# Patient Record
Sex: FEMALE | Race: WHITE | NOT HISPANIC OR LATINO | Employment: FULL TIME | URBAN - METROPOLITAN AREA
[De-identification: names, ages, dates, MRNs, and addresses within clinical notes are randomized per-mention and may not be internally consistent; named-entity substitution may affect disease eponyms.]

---

## 2017-05-01 ENCOUNTER — APPOINTMENT (OUTPATIENT)
Dept: MAMMOGRAPHY | Facility: HOSPITAL | Age: 42
End: 2017-05-01
Attending: OBSTETRICS & GYNECOLOGY

## 2017-05-05 ENCOUNTER — HOSPITAL ENCOUNTER (OUTPATIENT)
Dept: MAMMOGRAPHY | Facility: HOSPITAL | Age: 42
Discharge: HOME | End: 2017-05-05
Attending: OBSTETRICS & GYNECOLOGY | Admitting: OBSTETRICS & GYNECOLOGY

## 2017-05-05 ENCOUNTER — TRANSCRIBE ORDERS (OUTPATIENT)
Dept: MAMMOGRAPHY | Facility: HOSPITAL | Age: 42
End: 2017-05-05

## 2017-05-05 DIAGNOSIS — R92.8 ABNORMAL MAMMOGRAM: Primary | ICD-10-CM

## 2017-05-05 DIAGNOSIS — R92.8 ABNORMAL MAMMOGRAM: ICD-10-CM

## 2017-05-05 PROCEDURE — G0204 DX MAMMO INCL CAD BI: HCPCS

## 2017-05-05 PROCEDURE — 77066 DX MAMMO INCL CAD BI: CPT | Performed by: RADIOLOGY

## 2017-05-05 PROCEDURE — 77062 BREAST TOMOSYNTHESIS BI: CPT | Performed by: RADIOLOGY

## 2017-05-05 PROCEDURE — G0279 TOMOSYNTHESIS, MAMMO: HCPCS

## 2018-02-06 ENCOUNTER — TRANSCRIBE ORDERS (OUTPATIENT)
Dept: ADMINISTRATIVE | Facility: HOSPITAL | Age: 43
End: 2018-02-06

## 2018-02-06 DIAGNOSIS — R07.9 CHEST PAIN, UNSPECIFIED TYPE: Primary | ICD-10-CM

## 2018-02-15 ENCOUNTER — HOSPITAL ENCOUNTER (OUTPATIENT)
Dept: CARDIOLOGY | Facility: HOSPITAL | Age: 43
Discharge: HOME | End: 2018-02-15
Admitting: NURSE PRACTITIONER

## 2018-02-15 VITALS
SYSTOLIC BLOOD PRESSURE: 120 MMHG | BODY MASS INDEX: 23.05 KG/M2 | WEIGHT: 135 LBS | HEIGHT: 64 IN | DIASTOLIC BLOOD PRESSURE: 80 MMHG | HEART RATE: 71 BPM

## 2018-02-15 DIAGNOSIS — R07.9 CHEST PAIN, UNSPECIFIED TYPE: ICD-10-CM

## 2018-02-15 LAB
BH CV STRESS BP STAGE 1: NORMAL
BH CV STRESS BP STAGE 2: NORMAL
BH CV STRESS BP STAGE 3: NORMAL
BH CV STRESS DURATION MIN STAGE 1: 3
BH CV STRESS DURATION MIN STAGE 2: 3
BH CV STRESS DURATION MIN STAGE 3: 3
BH CV STRESS DURATION MIN STAGE 4: 1
BH CV STRESS DURATION SEC STAGE 1: 0
BH CV STRESS DURATION SEC STAGE 2: 0
BH CV STRESS DURATION SEC STAGE 3: 0
BH CV STRESS DURATION SEC STAGE 4: 30
BH CV STRESS GRADE STAGE 1: 10
BH CV STRESS GRADE STAGE 2: 12
BH CV STRESS GRADE STAGE 3: 14
BH CV STRESS GRADE STAGE 4: 16
BH CV STRESS HR STAGE 1: 93
BH CV STRESS HR STAGE 2: 116
BH CV STRESS HR STAGE 3: 146
BH CV STRESS HR STAGE 4: 166
BH CV STRESS METS STAGE 1: 5
BH CV STRESS METS STAGE 2: 7.5
BH CV STRESS METS STAGE 3: 10
BH CV STRESS METS STAGE 4: 13.5
BH CV STRESS PROTOCOL 1: NORMAL
BH CV STRESS RECOVERY BP: NORMAL MMHG
BH CV STRESS RECOVERY HR: 88 BPM
BH CV STRESS SPEED STAGE 1: 1.7
BH CV STRESS SPEED STAGE 2: 2.5
BH CV STRESS SPEED STAGE 3: 3.4
BH CV STRESS SPEED STAGE 4: 4.2
BH CV STRESS STAGE 1: 1
BH CV STRESS STAGE 2: 2
BH CV STRESS STAGE 3: 3
BH CV STRESS STAGE 4: 4
MAXIMAL PREDICTED HEART RATE: 178 BPM
PERCENT MAX PREDICTED HR: 94.94 %
STRESS BASELINE BP: NORMAL MMHG
STRESS BASELINE HR: 85 BPM
STRESS PERCENT HR: 112 %
STRESS POST ESTIMATED WORKLOAD: 12.5 METS
STRESS POST EXERCISE DUR MIN: 10 MIN
STRESS POST EXERCISE DUR SEC: 30 SEC
STRESS POST PEAK BP: NORMAL MMHG
STRESS POST PEAK HR: 169 BPM
STRESS TARGET HR: 151 BPM

## 2018-02-15 PROCEDURE — 93017 CV STRESS TEST TRACING ONLY: CPT

## 2018-02-15 PROCEDURE — 93018 CV STRESS TEST I&R ONLY: CPT | Performed by: INTERNAL MEDICINE

## 2018-02-19 ENCOUNTER — CONSULT (OUTPATIENT)
Dept: CARDIOLOGY | Facility: CLINIC | Age: 43
End: 2018-02-19

## 2018-02-19 VITALS
HEIGHT: 64 IN | DIASTOLIC BLOOD PRESSURE: 68 MMHG | WEIGHT: 135 LBS | HEART RATE: 65 BPM | SYSTOLIC BLOOD PRESSURE: 110 MMHG | BODY MASS INDEX: 23.05 KG/M2

## 2018-02-19 DIAGNOSIS — K21.9 GASTROESOPHAGEAL REFLUX DISEASE, ESOPHAGITIS PRESENCE NOT SPECIFIED: ICD-10-CM

## 2018-02-19 DIAGNOSIS — R07.2 PRECORDIAL PAIN: Primary | ICD-10-CM

## 2018-02-19 PROCEDURE — 93000 ELECTROCARDIOGRAM COMPLETE: CPT | Performed by: INTERNAL MEDICINE

## 2018-02-19 PROCEDURE — 99203 OFFICE O/P NEW LOW 30 MIN: CPT | Performed by: INTERNAL MEDICINE

## 2018-02-19 RX ORDER — OMEPRAZOLE 40 MG/1
40 CAPSULE, DELAYED RELEASE ORAL DAILY
Qty: 30 CAPSULE | Refills: 1 | Status: SHIPPED | OUTPATIENT
Start: 2018-02-19 | End: 2021-12-01

## 2018-02-19 RX ORDER — MEDROXYPROGESTERONE ACETATE 10 MG/1
1 TABLET ORAL
COMMUNITY
Start: 2018-01-18 | End: 2021-12-01

## 2018-02-19 NOTE — PROGRESS NOTES
Subjective:     Encounter Date:02/19/2018    Primary Care Physician: No Known Provider      Patient ID: Nathalia Bradshaw is a 43 y.o. female.    Chief Complaint:Chest Pain; Shortness of Breath; and Dizziness    PROBLEM LIST:  1. Chest pain  a. 2/2018 normal routine GXT  2. Surgeries:  a. Bilateral breast augmentation  b. Tonsillectomy and adenoidectomy      No Known Allergies      Current Outpatient Prescriptions:   •  medroxyPROGESTERone (PROVERA) 10 MG tablet, 1 tablet. 21 days out of the month, Disp: , Rfl:         History of Present Illness    Patient is a 43-year-old  female who we are seeing today for further evaluation of chest pain.  She has no previous history of coronary disease.  She has had chest pain off and on over the years.  Here within the last few weeks she had a severe.  Patient notes that this was a severe squeezing sensation which began after she laid down.  She got up and was noting to have some associated shortness of breath and dizziness.  It was also being made worse with deep breathing.  Since that time she has had some recurrent discomfort.  No specific triggering factors are noted.  In between these episodes she denies any cardiovascular limiting symptoms.  Does complain of some mild fatigue.  Does note a significant amount of stress in her life that she has been dealing with an addict.  Due to her symptoms she presented for further evaluation.  A routine GXT was ordered which was normal and negative for ischemia.    The following portions of the patient's history were reviewed and updated as appropriate: allergies, current medications, past family history, past medical history, past social history, past surgical history and problem list.    Family History   Problem Relation Age of Onset   • Heart murmur Mother    • Hypertension Mother    • No Known Problems Father    • No Known Problems Sister    • Ovarian cancer Neg Hx        Social History   Substance Use Topics   • Smoking  "status: Former Smoker     Quit date: 1/15/2018   • Smokeless tobacco: Never Used   • Alcohol use 2.4 oz/week     4 Glasses of wine per week      Comment: weekly         Review of Systems   Constitution: Positive for malaise/fatigue. Negative for fever and weakness.   HENT: Negative for nosebleeds.    Eyes: Negative for redness and visual disturbance.   Cardiovascular: Positive for chest pain. Negative for orthopnea, palpitations and paroxysmal nocturnal dyspnea.   Respiratory: Positive for shortness of breath. Negative for cough, snoring, sputum production and wheezing.    Hematologic/Lymphatic: Negative for bleeding problem.   Skin: Negative for flushing, itching and rash.   Musculoskeletal: Negative for falls, joint pain and muscle cramps.   Gastrointestinal: Positive for heartburn. Negative for abdominal pain, diarrhea, nausea and vomiting.   Genitourinary: Negative for hematuria.   Neurological: Positive for dizziness, headaches and vertigo. Negative for excessive daytime sleepiness and tremors.   Psychiatric/Behavioral: Negative for substance abuse. The patient is nervous/anxious.           Objective:    height is 162.6 cm (64\") and weight is 61.2 kg (135 lb). Her blood pressure is 110/68 and her pulse is 65.         Physical Exam   Constitutional: She is oriented to person, place, and time. She appears well-developed and well-nourished.   HENT:   Head: Normocephalic and atraumatic.   Mouth/Throat: Oropharynx is clear and moist.   Eyes: Conjunctivae are normal. Pupils are equal, round, and reactive to light.   Neck: Normal carotid pulses and no JVD present. Carotid bruit is not present. No thyromegaly present.   Cardiovascular: Normal rate, regular rhythm, S1 normal and S2 normal.  Exam reveals no gallop and no friction rub.    No murmur heard.  Pulses:       Carotid pulses are 2+ on the right side, and 2+ on the left side.       Dorsalis pedis pulses are 2+ on the right side, and 2+ on the left side.        " "Posterior tibial pulses are 2+ on the right side, and 2+ on the left side.   Pulmonary/Chest: No respiratory distress. She has no wheezes. She has no rales. She exhibits no tenderness.   Abdominal: She exhibits no distension, no abdominal bruit and no mass. There is no hepatosplenomegaly. There is no tenderness. There is no rebound.   Musculoskeletal: She exhibits no edema, tenderness or deformity.   Lymphadenopathy:     She has no cervical adenopathy.   Neurological: She is alert and oriented to person, place, and time. She has normal strength.   Skin: Skin is warm and dry. No rash noted. No cyanosis. Nails show no clubbing.   Psychiatric: She has a normal mood and affect. Cognition and memory are normal.         ECG 12 Lead  Date/Time: 2/19/2018 4:35 PM  Performed by: LELA NGUYEN  Authorized by: LELA NGUYEN   Rhythm: sinus rhythm  Clinical impression: normal ECG                  Assessment:   Assessment/Plan      Nathalia was seen today for chest pain, shortness of breath and dizziness.    Diagnoses and all orders for this visit:    Precordial pain    Gastroesophageal reflux disease, esophagitis presence not specified    Other orders  -     ECG 12 Lead      Assessment and plan: Patient with atypical chest pain, and a normal exercise stress test later this month.  She is low cardiovascular risk.  She quit smoking 1 month ago.  I suspect her symptoms are GERD/esophageal spasm.  We have discussed a 1 month trial of omeprazole.  We have asked her to cut back her caffeine intake, and she is going to \"work on her stress\".    If Does persist after the above, she did see her primary physician for possible EGD.  No Other cardiovascular recommendations at this time.       Claritza HENNESSY scribed portions of this dictation for  Dr. Nguyen.  .tmos    Dictated utilizing Dragon dictation  "

## 2018-03-12 ENCOUNTER — HOSPITAL ENCOUNTER (OUTPATIENT)
Dept: MAMMOGRAPHY | Facility: HOSPITAL | Age: 43
Discharge: HOME | End: 2018-03-12
Attending: OBSTETRICS & GYNECOLOGY | Admitting: OBSTETRICS & GYNECOLOGY

## 2018-03-12 DIAGNOSIS — R92.8 ABNORMAL MAMMOGRAM: ICD-10-CM

## 2018-03-12 PROCEDURE — 77066 DX MAMMO INCL CAD BI: CPT

## 2018-03-12 PROCEDURE — 77066 DX MAMMO INCL CAD BI: CPT | Performed by: RADIOLOGY

## 2018-03-12 PROCEDURE — 77062 BREAST TOMOSYNTHESIS BI: CPT | Performed by: RADIOLOGY

## 2018-03-12 PROCEDURE — G0279 TOMOSYNTHESIS, MAMMO: HCPCS

## 2020-10-12 ENCOUNTER — OFFICE VISIT (OUTPATIENT)
Dept: OBSTETRICS AND GYNECOLOGY | Facility: CLINIC | Age: 45
End: 2020-10-12

## 2020-10-12 VITALS
BODY MASS INDEX: 21.56 KG/M2 | WEIGHT: 126.3 LBS | DIASTOLIC BLOOD PRESSURE: 68 MMHG | HEIGHT: 64 IN | SYSTOLIC BLOOD PRESSURE: 108 MMHG

## 2020-10-12 DIAGNOSIS — R10.2 PELVIC PAIN: ICD-10-CM

## 2020-10-12 DIAGNOSIS — Z01.419 ENCOUNTER FOR ANNUAL ROUTINE GYNECOLOGICAL EXAMINATION: Primary | ICD-10-CM

## 2020-10-12 PROCEDURE — 99213 OFFICE O/P EST LOW 20 MIN: CPT | Performed by: NURSE PRACTITIONER

## 2020-10-12 PROCEDURE — 99396 PREV VISIT EST AGE 40-64: CPT | Performed by: NURSE PRACTITIONER

## 2020-10-12 NOTE — PROGRESS NOTES
GYN Annual Exam     CC - Here for annual exam.     Patient reports that she has been having lower abdominal pain after intercourse.  She describes the pain as a dull aching pain all throughout her lower abdomen and into her pelvis. She reports that this has been occurring for several years.        HPI  Nathalia Bradshaw is a 45 y.o. female, , who presents for annual well woman exam. 2020  Periods are irregular, lasting three days. Patient reports periods are sporadic, she will 3-8 months in between periods for the last two years. Dysmenorrhea:severe, occurring first 1-2 days of flow.  Patient reports problems with: none.  Partner Status: Marital Status: single.  New Partners since last visit: yes.  Desires STD Screening: yes.  Pelvic pain after IC for up to 4 days. She denies vaginal dryness, hot flashes. She had this problem last year and resolved with resolution of constipation but now she denies constipation or any bowel issues.     Additional OB/GYN History   Current contraception: condoms  Desires to: do not start contraception  Last Pap : 3/22/2019- normal  Last Completed Pap Smear       Status Date      PAP SMEAR No completions recorded        History of abnormal Pap smear: no  Family history of uterine, colon, breast, or ovarian cancer: yes - Breast- Maternal Aunt  Performs monthly Self-Breast Exam: yes  Last mammogram: 2017- she will schedule  Last Completed Mammogram     Patient has no health maintenance due at this time         Exercises Regularly: yes  Feelings of Anxiety or Depression: no  Tobacco Usage?: Yes Nathalia Bradshaw  reports that she has been smoking. She has never used smokeless tobacco.. I have educated her on the risk of diseases from using tobacco products such as cancer, COPD and heart disease.     I advised her to quit and she is not willing to quit.    I spent <3 min minutes counseling the patient.        OB History        1    Para   1    Term   1         "    AB        Living           SAB        TAB        Ectopic        Molar        Multiple        Live Births                    Health Maintenance   Topic Date Due   • Annual Gynecologic Pelvic and Breast Exam  1976   • ANNUAL PHYSICAL  01/20/1979   • TDAP/TD VACCINES (1 - Tdap) 01/20/1995   • HEPATITIS C SCREENING  02/19/2018   • PAP SMEAR  02/19/2018   • INFLUENZA VACCINE  08/01/2020   • Pneumococcal Vaccine 0-64  Aged Out       The additional following portions of the patient's history were reviewed and updated as appropriate: allergies, current medications, past family history, past medical history, past social history, past surgical history and problem list.    Review of Systems   Constitutional: Negative.    HENT: Negative.    Eyes: Negative.    Respiratory: Negative.    Cardiovascular: Negative.    Gastrointestinal: Positive for abdominal pain (lower abdominal pain after intercourse). Negative for blood in stool and constipation.   Endocrine: Negative.    Genitourinary: Positive for menstrual problem (irregular periods) and pelvic pain (After intercourse).   Musculoskeletal: Negative.    Skin: Negative.    Allergic/Immunologic: Negative.    Neurological: Negative.    Hematological: Negative.    Psychiatric/Behavioral: Negative.      All other systems reviewed and are negative.     I have reviewed and agree with the HPI, ROS, and historical information as entered above. Kathryn Joiner, APRN    Objective   /68   Ht 162.6 cm (64\")   Wt 57.3 kg (126 lb 4.8 oz)   LMP  (LMP Unknown)   BMI 21.68 kg/m²     Physical Exam  Vitals signs and nursing note reviewed. Exam conducted with a chaperone present.   Constitutional:       Appearance: She is well-developed.   HENT:      Head: Normocephalic and atraumatic.   Neck:      Musculoskeletal: Normal range of motion. No muscular tenderness.      Thyroid: No thyroid mass or thyromegaly.   Cardiovascular:      Rate and Rhythm: Normal rate and regular rhythm. "      Heart sounds: No murmur.   Pulmonary:      Effort: Pulmonary effort is normal. No retractions.      Breath sounds: Normal breath sounds. No wheezing, rhonchi or rales.   Chest:      Chest wall: No mass or tenderness.      Breasts:         Right: Normal. No mass, nipple discharge, skin change or tenderness.         Left: Normal. No mass, nipple discharge, skin change or tenderness.   Abdominal:      Palpations: Abdomen is soft. Abdomen is not rigid. There is no mass.      Tenderness: There is no abdominal tenderness. There is no guarding.      Hernia: No hernia is present. There is no hernia in the left inguinal area.   Genitourinary:     Labia:         Right: No rash, tenderness or lesion.         Left: No rash, tenderness or lesion.       Vagina: Normal. No vaginal discharge or lesions.      Cervix: No cervical motion tenderness, discharge, lesion or cervical bleeding.      Uterus: Normal. Not enlarged, not fixed and not tender.       Adnexa:         Right: No mass or tenderness.          Left: No mass or tenderness.        Rectum: No external hemorrhoid.   Neurological:      Mental Status: She is alert and oriented to person, place, and time.   Psychiatric:         Behavior: Behavior normal.            Assessment and Plan    Problem List Items Addressed This Visit     None      Visit Diagnoses     Encounter for annual routine gynecological examination    -  Primary    Relevant Orders    Pap IG, Ct-Ng, HPV-hr    Pelvic pain        Relevant Orders    US Non-ob Transvaginal          1. GYN annual well woman exam.   2. Encouraged use of condoms for STD prevention.  3. Reviewed monthly self breast exams.  Instructed to call with lumps, pain, or breast discharge.    4. Ordered Mammogram today  5. Recommended use of Vitamin D replacement and getting adequate calcium in her diet. (1500mg)  6. Reviewed exercise as a preventative health measures.   7. Smoking cessation encouraged.  Resources reviewed. (See above for  full cessation details).  8. Reccommended Flu Vaccine in Fall of each year.  9. RTC in 1 year or PRN with problems.   10. Discussed tracking cycles and after 1 year of amenorrhea considered postmenopausal. At that point any bldg should be reported. Labs last year showed perimenopause. She has never gone a year without a cycle  11. Check with insurance regarding screening colonoscopy at age 46.  12. RTO for U/S for pelvic pain, if normal, F/U with PCP     Kathryn Joiner, APRN  10/12/2020

## 2020-10-19 ENCOUNTER — OFFICE VISIT (OUTPATIENT)
Dept: OBSTETRICS AND GYNECOLOGY | Facility: CLINIC | Age: 45
End: 2020-10-19

## 2020-10-19 VITALS — WEIGHT: 127.5 LBS | BODY MASS INDEX: 21.89 KG/M2

## 2020-10-19 DIAGNOSIS — D25.9 UTERINE LEIOMYOMA, UNSPECIFIED LOCATION: Primary | ICD-10-CM

## 2020-10-19 PROCEDURE — 99212 OFFICE O/P EST SF 10 MIN: CPT | Performed by: NURSE PRACTITIONER

## 2020-10-19 NOTE — PROGRESS NOTES
Chief Complaint   Patient presents with   • Follow-up       Subjective   HPI  Nathalia Bradshaw is a 45 y.o. female, , who presents for follow up on pelvic pain. Was last seen for her annual exam on 10/12. At that time she reported lower abdominal pain after intercourse. She described the pain as a dull,aching pain in her lower abdomen and into her pelvis. She has been experiencing this for several years.    She had a TVUS today that showed three fibroids.  1. 9.7 mm x 5.8mm x 10.7mm - Anterior  2. 12.2 mm x 11.4mm x 12.9mm - Posterior   3. 15.0 mm x 11.0 mm x 12.9 mm x - Anterior      Her last LMP was Patient's last menstrual period was 10/13/2020 (exact date)..  Periods are rare, lasting 3 days.  Dysmenorrhea:severe, occurring first 1-2 days of flow.  Patient reports problems with: none.      Tobacco Usage?: Yes Nathalia Bradshaw  reports that she has been smoking. She has never used smokeless tobacco.. I have educated her on the risk of diseases from using tobacco products such as cancer, COPD and heart disease.     I advised her to quit and she is not willing to quit.        OB History        1    Para   1    Term   1            AB        Living           SAB        TAB        Ectopic        Molar        Multiple        Live Births                    Health Maintenance   Topic Date Due   • ANNUAL PHYSICAL  1979   • Pneumococcal Vaccine 0-64 (1 of 1 - PPSV23) 1982   • TDAP/TD VACCINES (1 - Tdap) 1995   • HEPATITIS C SCREENING  2018   • INFLUENZA VACCINE  2020   • Annual Gynecologic Pelvic and Breast Exam  10/13/2021   • PAP SMEAR  10/12/2023       The additional following portions of the patient's history were reviewed and updated as appropriate: allergies, current medications, past family history, past medical history, past social history, past surgical history and problem list.    Review of Systems   Constitutional: Negative.    Respiratory: Negative.     Genitourinary: Positive for dyspareunia and pelvic pain. Negative for dysuria and vaginal discharge.       I have reviewed and agree with the HPI, ROS, and historical information as entered above. Kathryn Joiner, APRN    Objective   Wt 57.8 kg (127 lb 8 oz)   LMP 10/13/2020 (Exact Date)   BMI 21.89 kg/m²     Physical Exam  Constitutional:       Appearance: Normal appearance.   Neurological:      Mental Status: She is alert.         Assessment/Plan     Assessment     Problem List Items Addressed This Visit     None      Visit Diagnoses     Uterine leiomyoma, unspecified location    -  Primary    Relevant Orders    US Non-ob Transvaginal          1. Unsure if this is the cause of her postcoital pelvic pain     Plan     Return in about 3 months (around 1/19/2021) for U/S F/U fibroid.  2.  U/S with 3 small fibroids, I am not sure that this is the cause of her pain. Advised to follow up with PCP as well, call insurance to see if they will cover screening colonoscopy at age 46. She denies any bowel changes.       Kathryn Joiner, GERHARD  10/19/2020

## 2021-12-01 PROCEDURE — U0004 COV-19 TEST NON-CDC HGH THRU: HCPCS | Performed by: NURSE PRACTITIONER

## 2022-04-11 ENCOUNTER — OFFICE VISIT (OUTPATIENT)
Dept: OBSTETRICS AND GYNECOLOGY | Facility: CLINIC | Age: 47
End: 2022-04-11

## 2022-04-11 VITALS
DIASTOLIC BLOOD PRESSURE: 62 MMHG | BODY MASS INDEX: 23.52 KG/M2 | HEIGHT: 64 IN | SYSTOLIC BLOOD PRESSURE: 106 MMHG | WEIGHT: 137.8 LBS

## 2022-04-11 DIAGNOSIS — Z12.31 ENCOUNTER FOR SCREENING MAMMOGRAM FOR MALIGNANT NEOPLASM OF BREAST: Primary | ICD-10-CM

## 2022-04-11 DIAGNOSIS — D21.9 FIBROIDS: ICD-10-CM

## 2022-04-11 DIAGNOSIS — N64.4 BREAST PAIN: ICD-10-CM

## 2022-04-11 PROCEDURE — 99212 OFFICE O/P EST SF 10 MIN: CPT | Performed by: NURSE PRACTITIONER

## 2022-04-11 NOTE — PROGRESS NOTES
Chief Complaint   Patient presents with   • Follow-up     Breast tenderness       Subjective     HPI  Nathalia Bradshaw is a 47 y.o. female, , who presents with breast complaints of breast tenderness.  This is present in bilateral breast(s).    She states she has experienced this problem for 2 weeks.  She describes the severity as intermittent.  She states that the problem has changed since she discovered it. The patient denies breast lump, changed mole, dryness, nipple discharge and skin color change. She has had a mammogram before. She does have a family history of breast cancer in her Maternal Aunt. They were diagnosed at age late 50's. Patient states that she thinks breast tenderness is due to hormones. She states that it started after having episodes of hot flashes. Her hot flashes went away and then the breast tenderness started. She reports that her breast tenderness has resolved    Her last LMP was Patient's last menstrual period was 10/13/2020 (exact date)..  Periods are absent due to menopause.    Current contraception: contraceptive methods: Post menopausal status    Last mammogram:   Last Completed Mammogram     This patient has no relevant Health Maintenance data.        Tobacco Usage?: No     OB History        1    Para   1    Term   1            AB        Living           SAB        IAB        Ectopic        Molar        Multiple        Live Births                    Past Medical History:   Diagnosis Date   • Depression    • History of mammogram 2017    BILATERAL DX-PROBABLY BENIGN   • HSV (herpes simplex virus) infection    • Irregular menses    • Kidney stone    • Papanicolaou smear 2018    NEG   • Urinary tract infection    • Varicella 83       Past Surgical History:   Procedure Laterality Date   • AUGMENTATION MAMMAPLASTY Bilateral     saline   • TONSILECTOMY, ADENOIDECTOMY, BILATERAL MYRINGOTOMY AND TUBES         Family History   Problem Relation Age  "of Onset   • Heart murmur Mother    • Hypertension Mother    • Heart disease Mother    • No Known Problems Father    • No Known Problems Sister    • Breast cancer Maternal Aunt         age unknown   • Hypertension Maternal Grandmother    • Diabetes Maternal Grandfather    • Ovarian cancer Neg Hx           Review of Systems   Constitutional: Negative.    Respiratory: Negative.    Cardiovascular: Negative.    Gastrointestinal: Negative.    Genitourinary: Negative.  Positive for breast pain. Negative for breast discharge and breast lump.   Neurological: Negative.    Psychiatric/Behavioral: Negative.        I have reviewed and agree with the HPI, ROS, and historical information as entered above. Kathryn Joiner, APRN    Objective   /62   Ht 162.6 cm (64\")   Wt 62.5 kg (137 lb 12.8 oz)   LMP 10/13/2020 (Exact Date)   BMI 23.65 kg/m²     Physical Exam  Constitutional:       Appearance: Normal appearance.   Pulmonary:      Effort: Pulmonary effort is normal.   Chest:   Breasts:      Right: Normal. No mass, nipple discharge, skin change or tenderness.      Left: Normal. No mass, nipple discharge, skin change or tenderness.        Comments: Breast implants present bilaterally  Neurological:      Mental Status: She is alert.           Assessment/Plan     Assessment     Problem List Items Addressed This Visit    None     Visit Diagnoses     Encounter for screening mammogram for malignant neoplasm of breast    -  Primary    Relevant Orders    Mammo Screening Digital Tomosynthesis Bilateral With CAD    Breast pain        Fibroids        Relevant Orders    US Non-ob Transvaginal        Bilateral breast pain which has now resolved. Exam normal. Proceed with screening mammo    Plan   1.  Arranged for mammogram.  2. Continue self breast exams.  Return in about 3 months (around 7/11/2022) for Annual physical, U/S F/U fibroids.   Vasomotor symptoms mostly controlled with OTC supplements.      Kathryn Joiner, " APRN  04/11/2022

## 2022-05-03 ENCOUNTER — HOSPITAL ENCOUNTER (OUTPATIENT)
Dept: MAMMOGRAPHY | Facility: HOSPITAL | Age: 47
Discharge: HOME | End: 2022-05-03
Admitting: NURSE PRACTITIONER

## 2022-05-03 DIAGNOSIS — Z12.31 ENCOUNTER FOR SCREENING MAMMOGRAM FOR MALIGNANT NEOPLASM OF BREAST: ICD-10-CM

## 2022-05-03 PROCEDURE — 77067 SCR MAMMO BI INCL CAD: CPT | Performed by: RADIOLOGY

## 2022-05-03 PROCEDURE — 77063 BREAST TOMOSYNTHESIS BI: CPT | Performed by: RADIOLOGY

## 2022-05-03 PROCEDURE — 77067 SCR MAMMO BI INCL CAD: CPT

## 2022-05-03 PROCEDURE — 77063 BREAST TOMOSYNTHESIS BI: CPT

## 2022-07-13 ENCOUNTER — OFFICE VISIT (OUTPATIENT)
Dept: OBSTETRICS AND GYNECOLOGY | Facility: CLINIC | Age: 47
End: 2022-07-13

## 2022-07-13 VITALS
HEIGHT: 64 IN | WEIGHT: 135.6 LBS | DIASTOLIC BLOOD PRESSURE: 88 MMHG | BODY MASS INDEX: 23.15 KG/M2 | SYSTOLIC BLOOD PRESSURE: 126 MMHG

## 2022-07-13 DIAGNOSIS — N94.10 FEMALE DYSPAREUNIA: ICD-10-CM

## 2022-07-13 DIAGNOSIS — D21.9 FIBROIDS: ICD-10-CM

## 2022-07-13 DIAGNOSIS — Z01.419 WOMEN'S ANNUAL ROUTINE GYNECOLOGICAL EXAMINATION: Primary | ICD-10-CM

## 2022-07-13 PROCEDURE — 99396 PREV VISIT EST AGE 40-64: CPT | Performed by: NURSE PRACTITIONER

## 2022-07-13 RX ORDER — CONJUGATED ESTROGENS 0.62 MG/G
CREAM VAGINAL 2 TIMES WEEKLY
Qty: 0.5 G | Refills: 1 | Status: SHIPPED | OUTPATIENT
Start: 2022-07-14

## 2022-07-13 NOTE — PROGRESS NOTES
GYN Annual Exam     CC - Here for annual exam.        [facility]  Nathalia Bradshaw is a 47 y.o. female, , who presents for annual well woman exam.  She is postmenopausal.  Patient denies vaginal bleeding. ..  Patient reports problems with: none. There were no changes to her medical or surgical history since her last visit.. Partner Status: Marital Status: engaged.  She is sexually active. She has not had new partners since her last STD testing. She does not desire STD testing. STD testing recommendations have been explained to the patient.. STD testing recommendations have been explained to the patient and she does not desire STD testing.    Patient is requesting to have her hormone levels checked. Patient denies any other issues or concerns at today's office visit.     Additional OB/GYN History   Current contraception: contraceptive methods: None  Desires to: do not start contraception  On HRT? No  Last Pap : 10/12/2020. Results: Normal- HPV negative  Last Completed Pap Smear          PAP SMEAR (Every 3 Years) Next due on 10/12/2023    10/12/2020  SCANNED - PAP SMEAR              History of abnormal Pap smear: no  Family history of uterine, colon, breast, or ovarian cancer: yes - Maternal Aunt has  breast cancer  Performs monthly Self-Breast Exam: yes  Last mammogram: 5/3/22-negative    Last Completed Mammogram     This patient has no relevant Health Maintenance data.        Last colonoscopy: [email or web address]  Last Completed Colonoscopy     This patient has no relevant Health Maintenance data.        Last DEXA: None  Exercises Regularly: no  Feelings of Anxiety or Depression: yes - Depression      Tobacco Usage?: No   OB History        1    Para   1    Term   1            AB        Living   1       SAB        IAB        Ectopic        Molar        Multiple        Live Births                    Health Maintenance   Topic Date Due   • COLORECTAL CANCER SCREENING  Never done   • ANNUAL PHYSICAL  Never  "done   • TDAP/TD VACCINES (1 - Tdap) Never done   • HEPATITIS C SCREENING  Never done   • COVID-19 Vaccine (2 - Pfizer series) 08/25/2021   • INFLUENZA VACCINE  10/01/2022   • Annual Gynecologic Pelvic and Breast Exam  07/14/2023   • PAP SMEAR  10/12/2023   • Pneumococcal Vaccine 0-64  Aged Out       The additional following portions of the patient's history were reviewed and updated as appropriate: allergies and current medications.    Review of Systems   Constitutional: Negative.    Respiratory: Negative.    Cardiovascular: Negative.    Gastrointestinal: Negative.    Genitourinary: Positive for dyspareunia.   Neurological: Negative.        I have reviewed and agree with the HPI, ROS, and historical information as entered above. Kathryn Joiner, APRN    Objective   /88   Ht 162.6 cm (64\")   Wt 61.5 kg (135 lb 9.6 oz)   LMP 10/13/2020 (Exact Date)   Breastfeeding No   BMI 23.28 kg/m²     Physical Exam  Vitals and nursing note reviewed. Exam conducted with a chaperone present.   Constitutional:       Appearance: She is well-developed.   HENT:      Head: Normocephalic and atraumatic.   Neck:      Thyroid: No thyroid mass or thyromegaly.   Cardiovascular:      Rate and Rhythm: Normal rate and regular rhythm.      Heart sounds: No murmur heard.  Pulmonary:      Effort: Pulmonary effort is normal. No retractions.      Breath sounds: Normal breath sounds. No wheezing, rhonchi or rales.   Chest:      Chest wall: No mass or tenderness.   Breasts:      Right: Normal. No mass, nipple discharge, skin change or tenderness.      Left: Normal. No mass, nipple discharge, skin change or tenderness.       Abdominal:      Palpations: Abdomen is soft. Abdomen is not rigid. There is no mass.      Tenderness: There is no abdominal tenderness. There is no guarding.      Hernia: No hernia is present. There is no hernia in the left inguinal area.   Genitourinary:     Labia:         Right: No rash, tenderness or lesion.         " Left: No rash, tenderness or lesion.       Vagina: Normal. No vaginal discharge or lesions.      Cervix: No cervical motion tenderness, discharge, lesion or cervical bleeding.      Uterus: Normal. Not enlarged, not fixed and not tender.       Adnexa:         Right: No mass or tenderness.          Left: No mass or tenderness.        Rectum: No external hemorrhoid.   Musculoskeletal:      Cervical back: Normal range of motion. No muscular tenderness.   Neurological:      Mental Status: She is alert and oriented to person, place, and time.   Psychiatric:         Behavior: Behavior normal.            Assessment and Plan    Problem List Items Addressed This Visit        Hematology and Neoplasia    Fibroids    Overview     3 small fibroids, all subcentimeter. Shrinking at follow up.             Other Visit Diagnoses     Women's annual routine gynecological examination    -  Primary    Female dyspareunia              1. GYN annual well woman exam.   2. Reviewed monthly self breast exams.  Instructed to call with lumps, pain, or breast discharge.  Yearly mammograms ordered.  3. Ordered mammogram today.  4. Symptoms of menopausal transition reviewed with patient.  Reviewed risks/benefits of OTC, non-hormonal and hormonal treatment for vasomotor and other menopausal symptoms.  5. RTC in 1 year or PRN with problems.  6. Return in about 1 year (around 7/13/2023) for Annual physical.   7. Start premarin vaginal cream 2 times weekly. Advised coconut oil as lubricant    Kathryn Joiner, APRN  07/13/2022

## 2022-12-13 ENCOUNTER — TELEPHONE (OUTPATIENT)
Dept: OBSTETRICS AND GYNECOLOGY | Facility: CLINIC | Age: 47
End: 2022-12-13

## 2022-12-13 NOTE — TELEPHONE ENCOUNTER
Patient without period x2 years. Not using HRT. C/o pelvic pain/breast/nipple tenderness that has been intermittent. Now with light vaginal bleeding that began yesterday.     Instructed needs eval with u/s. Scheduled with JASS 12/20/22 at 1130. She vu. Patient to cb with heavy bleeding/severe pain. She vu.

## 2022-12-13 NOTE — TELEPHONE ENCOUNTER
Provider: MICKEY WOODS  Caller: URIAH PARSONS  Relationship to Patient: SELF  Pharmacy:[facility] [id number] - [place] [address] [address] AT [facility] [phone]  - [phone]   [phone]  Phone Number: [phone]  Reason for Call: PT IS HAVING POSTMENOPAUSAL BLEEDING// BREAST ARE SORE TO TOUCH  When was the patient last seen: 4/2022  When did it start: BREAST HAVE BEEN SORE FOR A WEEK, BLEEDING STARTED YESTERDAY

## 2022-12-20 ENCOUNTER — OFFICE VISIT (OUTPATIENT)
Dept: OBSTETRICS AND GYNECOLOGY | Facility: CLINIC | Age: 47
End: 2022-12-20

## 2022-12-20 VITALS
DIASTOLIC BLOOD PRESSURE: 72 MMHG | SYSTOLIC BLOOD PRESSURE: 110 MMHG | WEIGHT: 136 LBS | HEIGHT: 64 IN | BODY MASS INDEX: 23.22 KG/M2

## 2022-12-20 DIAGNOSIS — N95.0 PMB (POSTMENOPAUSAL BLEEDING): Primary | ICD-10-CM

## 2022-12-20 DIAGNOSIS — N83.201 CYST OF RIGHT OVARY: ICD-10-CM

## 2022-12-20 DIAGNOSIS — D25.9 UTERINE LEIOMYOMA, UNSPECIFIED LOCATION: ICD-10-CM

## 2022-12-20 PROCEDURE — 99213 OFFICE O/P EST LOW 20 MIN: CPT | Performed by: NURSE PRACTITIONER

## 2022-12-20 NOTE — PROGRESS NOTES
"            Chief Complaint   Patient presents with   • Follow-up     PMB, pelvic pain       Subjective   HPI  Nathalia Bradshaw is a 47 y.o. female, .   Patient's last menstrual period was 10/13/2020 (exact date).. who presents for postmenopausal vaginal bleeding.   She had one day of PMB last week. She describes the bleeding as bright red but not heavy and with associated breast tenderness. She has pelvic pain that radiates from side to side, right greater than left. She denies bowel issues, fever, dysuria, abn dc, itching, odor.   She did start taking supplements which are meant to \"balance hormones\".          Additional OB/GYN History     Pap up to date  Mammogram up to date      Tobacco Usage?: No   OB History        1    Para   1    Term   1            AB        Living   1       SAB        IAB        Ectopic        Molar        Multiple        Live Births                      Current Outpatient Medications:   •  conjugated estrogens (Premarin) 0.625 MG/GM vaginal cream, Insert  into the vagina 2 (Two) Times a Week., Disp: 0.5 g, Rfl: 1     Past Medical History:   Diagnosis Date   • Depression    • Fibroid    • History of mammogram 2017    BILATERAL DX-PROBABLY BENIGN   • HSV (herpes simplex virus) infection    • Irregular menses    • Kidney stone    • Papanicolaou smear 2018    NEG   • Urinary tract infection 2019   • Varicella 83        Past Surgical History:   Procedure Laterality Date   • AUGMENTATION MAMMAPLASTY Bilateral     saline   • TONSILECTOMY, ADENOIDECTOMY, BILATERAL MYRINGOTOMY AND TUBES         The additional following portions of the patient's history were reviewed and updated as appropriate: current medications, past family history, past medical history, past social history, past surgical history and problem list.    Review of Systems   Genitourinary: Positive for pelvic pain and vaginal bleeding.   All other systems reviewed and are negative.      I " "have reviewed and agree with the HPI, ROS, and historical information as entered above. Catahilary Fonseca, APRN    Objective   /72   Ht 162.6 cm (64\")   Wt 61.7 kg (136 lb)   LMP 10/13/2020 (Exact Date)   BMI 23.34 kg/m²     Physical Exam  Vitals and nursing note reviewed.   Constitutional:       General: She is not in acute distress.     Appearance: Normal appearance. She is not ill-appearing.   Pulmonary:      Effort: Pulmonary effort is normal. No respiratory distress.   Skin:     General: Skin is warm and dry.   Neurological:      Mental Status: She is alert and oriented to person, place, and time.   Psychiatric:         Mood and Affect: Mood normal.         Behavior: Behavior normal.         Assessment & Plan     Assessment     Problem List Items Addressed This Visit    None  Visit Diagnoses     PMB (postmenopausal bleeding)    -  Primary    Relevant Orders    US Non-ob Transvaginal    Uterine leiomyoma, unspecified location        Cyst of right ovary              Plan     Return in about 6 weeks (around 1/31/2023) for US FU; PMB, fibroids, ov cyst.  2.   D/w pt US today shows multiple stable fibroids at 1 cm each and two 2 cm ov cysts to the right; EMS 2.7 mm, no free fluid.  PMB could be related to new supplements; stop supplements.  RTO 6 wks US Fu.  Call prn severe pain or return of bleeding.      Cata Fonseca, APRN  12/20/2022  "